# Patient Record
Sex: FEMALE | Race: WHITE | URBAN - METROPOLITAN AREA
[De-identification: names, ages, dates, MRNs, and addresses within clinical notes are randomized per-mention and may not be internally consistent; named-entity substitution may affect disease eponyms.]

---

## 2017-02-06 DIAGNOSIS — B18.2 CHRONIC HEPATITIS C WITHOUT HEPATIC COMA (H): Primary | ICD-10-CM

## 2017-03-13 ENCOUNTER — TELEPHONE (OUTPATIENT)
Dept: GASTROENTEROLOGY | Facility: CLINIC | Age: 57
End: 2017-03-13

## 2017-03-13 NOTE — TELEPHONE ENCOUNTER
Patient is coming up on being due for the 3 month post HCV Tx viral load as of 03/16/17. She completed HCV Tx with Harvoni x 8 weeks on 12/16/16, and had an undetected viral load. Reminder call to patient to complete lab. She will go to the [facility] this week, or early next week for viral load and liver labs. She will see Dr. Crawford on 03/28/17. The labs orders have been placed in Epic. Patient states understanding with no further questions at this time.     Jessy Serrano LPN  [facility] - Hepatitis C Program

## 2017-03-20 NOTE — TELEPHONE ENCOUNTER
Received a call from patient giancarlodeon she will be going to the [facility] for lab. Lab order faxed to [facility] at [phone]. I will f/u with patient once I receive results.     Jessy Serrano LPN  [facility] - Hepatitis C Program

## 2017-03-28 ENCOUNTER — OFFICE VISIT (OUTPATIENT)
Dept: GASTROENTEROLOGY | Facility: CLINIC | Age: 57
End: 2017-03-28
Payer: COMMERCIAL

## 2017-03-28 VITALS
HEART RATE: 104 BPM | HEIGHT: 62 IN | TEMPERATURE: 99.1 F | DIASTOLIC BLOOD PRESSURE: 75 MMHG | OXYGEN SATURATION: 98 % | WEIGHT: 113.2 LBS | BODY MASS INDEX: 20.83 KG/M2 | SYSTOLIC BLOOD PRESSURE: 122 MMHG

## 2017-03-28 DIAGNOSIS — B18.2 CHRONIC HEPATITIS C WITHOUT HEPATIC COMA (H): Primary | ICD-10-CM

## 2017-03-28 LAB — HCV RNA QUANT IU/ML: NOT DETECTED IU/ML

## 2017-03-28 PROCEDURE — 99212 OFFICE O/P EST SF 10 MIN: CPT | Mod: ZF

## 2017-03-28 ASSESSMENT — PAIN SCALES - GENERAL: PAINLEVEL: NO PAIN (0)

## 2017-03-28 NOTE — NURSING NOTE
Chief Complaint   Patient presents with     RECHECK     Hepatitis C   Pt roomed, vitals, meds, and allergies reviewed with pt. Pt ready for provider.  Dennis Alvarez, CMA

## 2017-03-28 NOTE — PROGRESS NOTES
I had the pleasure of seeing Delmy Gomez for followup in the Liver Clinic at the [facility] on 03/28/2017.  Ms. Gomez returns for followup now 3 months having completed treatment for her hepatitis C.  She received 8 weeks of Harvoni.      She is doing well at this visit.  She denies any abdominal pain, itching or skin rash or fatigue.  She feels as though she is starting to get an upper respiratory infection and has had just a minor fever as well as a sore throat.  She denies any cough or shortness of breath.  She denies any nausea or vomiting, diarrhea or constipation.  Her appetite has been good, and her weight has been stable.  There have been no other new events since she was last seen.       Current Outpatient Prescriptions   Medication     alendronate (FOSAMAX) 70 MG tablet     EPINEPHrine (EPIPEN) 0.3 MG/0.3ML injection     Cholecalciferol (VITAMIN D3 PO)     calcium carbonate (OS-LUIGI 500 MG [place]. [place]) 500 MG tablet     DiphenhydrAMINE HCl (BENADRYL PO)     ACYCLOVIR PO     No current facility-administered medications for this visit.      B/P: 122/75, T: 99.1, P: 104, R: Data Unavailable    HEENT exam shows no scleral icterus and no temporal muscle wasting.  Chest is clear.  Abdominal exam shows no increase in girth.  No masses or tenderness to palpation are present.  Liver is 10 cm in span without left lobe enlargement.  No spleen tip is palpable, and extremity exam shows no edema.  Skin exam shows no stigmata of chronic liver disease.  Neurologic exam shows no asterixis.       Her HCV RNA level is negative at 3 months after stopping treatment.      My impression is that Ms. Gomez is a sustained virologic responder to hepatitis C treatment, and she will not need to be followed at this point in time, as she has fairly minimal fibrosis as demonstrated on a fibrosis scan.  She obviously is very gratified to hear this, but she is welcome to come back if anything were to  change in the future.      Thank you very much for allowing me to participate in the care of this patient.  If you have any questions regarding my recommendations, please do not hesitate to contact me.       Martin Crawford MD      Professor of Medicine  [employer]      Executive Medical Director, Solid Organ Transplant Program  [facility]

## 2017-03-28 NOTE — TELEPHONE ENCOUNTER
Received HCV RNA quant result - not detected. Patient is cured of hepatitis C. She is seeing Dr. Crawford this morning, 03/28/17, where results/plan will be discussed. She will no longer require follow up in the liver clinic since she has stage 0-1 fibrosis (minimal to no liver scarring/damage). Results entered into Epic.     Jessy Serrano LPN  [facility] - Hepatitis C Program

## 2017-03-28 NOTE — LETTER
3/28/2017      RE: Delmy Valdivia  [address] [place] [address]       I had the pleasure of seeing Delmy Gomez for followup in the Liver Clinic at the [facility] on 03/28/2017.  Ms. Gomez returns for followup now 3 months having completed treatment for her hepatitis C.  She received 8 weeks of Harvoni.      She is doing well at this visit.  She denies any abdominal pain, itching or skin rash or fatigue.  She feels as though she is starting to get an upper respiratory infection and has had just a minor fever as well as a sore throat.  She denies any cough or shortness of breath.  She denies any nausea or vomiting, diarrhea or constipation.  Her appetite has been good, and her weight has been stable.  There have been no other new events since she was last seen.       Current Outpatient Prescriptions   Medication     alendronate (FOSAMAX) 70 MG tablet     EPINEPHrine (EPIPEN) 0.3 MG/0.3ML injection     Cholecalciferol (VITAMIN D3 PO)     calcium carbonate (OS-LUIGI 500 MG [place]. [place]) 500 MG tablet     DiphenhydrAMINE HCl (BENADRYL PO)     ACYCLOVIR PO     No current facility-administered medications for this visit.      B/P: 122/75, T: 99.1, P: 104, R: Data Unavailable    HEENT exam shows no scleral icterus and no temporal muscle wasting.  Chest is clear.  Abdominal exam shows no increase in girth.  No masses or tenderness to palpation are present.  Liver is 10 cm in span without left lobe enlargement.  No spleen tip is palpable, and extremity exam shows no edema.  Skin exam shows no stigmata of chronic liver disease.  Neurologic exam shows no asterixis.       Her HCV RNA level is negative at 3 months after stopping treatment.      My impression is that Ms. Gomez is a sustained virologic responder to hepatitis C treatment, and she will not need to be followed at this point in time, as she has fairly minimal fibrosis as demonstrated on a fibrosis scan.  She obviously  is very gratified to hear this, but she is welcome to come back if anything were to change in the future.      Thank you very much for allowing me to participate in the care of this patient.  If you have any questions regarding my recommendations, please do not hesitate to contact me.       Martin Crawford MD      Professor of Medicine  [employer]      Executive Medical Director, Solid Organ Transplant Program  [facility]

## 2017-03-28 NOTE — LETTER
3/28/2017       RE: Delmy Valdivia  [address] [place] [address]     Dear Colleague,    Thank you for referring your patient, Delmy Valdivia, to the [facility] HEPATOLOGY at [facility]. Please see a copy of my visit note below.    I had the pleasure of seeing Delmy Gomez for followup in the Liver Clinic at the [facility] on 03/28/2017.  Ms. Gomez returns for followup now 3 months having completed treatment for her hepatitis C.  She received 8 weeks of Harvoni.      She is doing well at this visit.  She denies any abdominal pain, itching or skin rash or fatigue.  She feels as though she is starting to get an upper respiratory infection and has had just a minor fever as well as a sore throat.  She denies any cough or shortness of breath.  She denies any nausea or vomiting, diarrhea or constipation.  Her appetite has been good, and her weight has been stable.  There have been no other new events since she was last seen.       Current Outpatient Prescriptions   Medication     alendronate (FOSAMAX) 70 MG tablet     EPINEPHrine (EPIPEN) 0.3 MG/0.3ML injection     Cholecalciferol (VITAMIN D3 PO)     calcium carbonate (OS-LUIGI 500 MG [place]. [place]) 500 MG tablet     DiphenhydrAMINE HCl (BENADRYL PO)     ACYCLOVIR PO     No current facility-administered medications for this visit.      B/P: 122/75, T: 99.1, P: 104, R: Data Unavailable    HEENT exam shows no scleral icterus and no temporal muscle wasting.  Chest is clear.  Abdominal exam shows no increase in girth.  No masses or tenderness to palpation are present.  Liver is 10 cm in span without left lobe enlargement.  No spleen tip is palpable, and extremity exam shows no edema.  Skin exam shows no stigmata of chronic liver disease.  Neurologic exam shows no asterixis.       Her HCV RNA level is negative at 3 months after stopping treatment.      My impression is that Ms. Gomez is a  sustained virologic responder to hepatitis C treatment, and she will not need to be followed at this point in time, as she has fairly minimal fibrosis as demonstrated on a fibrosis scan.  She obviously is very gratified to hear this, but she is welcome to come back if anything were to change in the future.      Thank you very much for allowing me to participate in the care of this patient.  If you have any questions regarding my recommendations, please do not hesitate to contact me.       Martin Crawford MD      Professor of Medicine  [employer]      Executive Medical Director, Solid Organ Transplant Program

## 2017-03-28 NOTE — MR AVS SNAPSHOT
"              After Visit Summary   3/28/2017    Delmy Valdivia    MRN: [id number]           Patient Information     Date Of Birth          1960        Visit Information        Provider Department      3/28/2017 11:00 AM Martin Crawford MD [facility] Hepatology        Today's Diagnoses     Chronic hepatitis C without hepatic coma (H)    -  1       Follow-ups after your visit        Follow-up notes from your care team     Return if symptoms worsen or fail to improve.      Who to contact     If you have questions or need follow up information about today's clinic visit or your schedule please contact [facility] HEPATOLOGY directly at [phone].  Normal or non-critical lab and imaging results will be communicated to you by [employer], letter or phone within 4 business days after the clinic has received the results. If you do not hear from us within 7 days, please contact the clinic through [employer] or phone. If you have a critical or abnormal lab result, we will notify you by phone as soon as possible.  Submit refill requests through [employer] or call your pharmacy and they will forward the refill request to us. Please allow 3 business days for your refill to be completed.          Additional Information About Your Visit        MyChart Information     [employer] lets you send messages to your doctor, view your test results, renew your prescriptions, schedule appointments and more. To sign up, go to [email or web address] . Click on \"Log in\" on the left side of the screen, which will take you to the Welcome page. Then click on \"Sign up Now\" on the right side of the page.     You will be asked to enter the access code listed below, as well as some personal information. Please follow the directions to create your username and password.     Your access code is: [email or web address]  Expires: 2017  6:30 AM     Your access code will  in 90 days. If you need help or a new code, please call your [facility] or [phone].   " "     Care EveryWhere ID     This is your Care EveryWhere ID. This could be used by other organizations to access your [place] medical records  [id number]        Your Vitals Were     Pulse Temperature Height Pulse Oximetry BMI (Body Mass Index)       104 99.1  F (37.3  C) (Oral) 1.575 m (5' 2\") 98% 20.7 kg/m2        Blood Pressure from Last 3 Encounters:   03/28/17 122/75   11/22/16 117/80   07/18/16 146/79    Weight from Last 3 Encounters:   03/28/17 51.3 kg (113 lb 3.2 oz)   11/22/16 51.2 kg (112 lb 12.8 oz)   07/18/16 51.3 kg (113 lb 3.2 oz)              Today, you had the following     No orders found for display       Primary Care Provider Office Phone # Fax #    Sachin Nixon [phone]       [address] [address]        Thank you!     Thank you for choosing [facility] HEPATOLOGY  for your care. Our goal is always to provide you with excellent care. Hearing back from our patients is one way we can continue to improve our services. Please take a few minutes to complete the written survey that you may receive in the mail after your visit with us. Thank you!             Your Updated Medication List - Protect others around you: Learn how to safely use, store and throw away your medicines at www.disposemymeds.org.          This list is accurate as of: 3/28/17 11:59 PM.  Always use your most recent med list.                   Brand Name Dispense Instructions for use    ACYCLOVIR PO      Take by mouth as needed Reported on 3/28/2017       alendronate 70 MG tablet    FOSAMAX     Take 70 mg by mouth every 7 days       BENADRYL PO      Take by mouth as needed       calcium carbonate 500 MG tablet    OS-LUIGI 500 mg [place]. [place]     Take 500 mg by mouth daily       EPINEPHrine 0.3 MG/0.3ML injection      Inject 0.3 mg into the muscle once as needed for anaphylaxis       VITAMIN D3 PO      Take 1,000 Units by mouth daily         "

## 2022-11-28 ENCOUNTER — APPOINTMENT (OUTPATIENT)
Dept: URBAN - METROPOLITAN AREA CLINIC 256 | Age: 62
Setting detail: DERMATOLOGY
End: 2022-11-29

## 2022-11-28 DIAGNOSIS — Z71.89 OTHER SPECIFIED COUNSELING: ICD-10-CM

## 2022-11-28 DIAGNOSIS — L82.0 INFLAMED SEBORRHEIC KERATOSIS: ICD-10-CM

## 2022-11-28 DIAGNOSIS — D22 MELANOCYTIC NEVI: ICD-10-CM

## 2022-11-28 DIAGNOSIS — L57.8 OTHER SKIN CHANGES DUE TO CHRONIC EXPOSURE TO NONIONIZING RADIATION: ICD-10-CM

## 2022-11-28 DIAGNOSIS — L82.1 OTHER SEBORRHEIC KERATOSIS: ICD-10-CM

## 2022-11-28 DIAGNOSIS — D18.0 HEMANGIOMA: ICD-10-CM

## 2022-11-28 PROBLEM — D22.5 MELANOCYTIC NEVI OF TRUNK: Status: ACTIVE | Noted: 2022-11-28

## 2022-11-28 PROBLEM — D18.01 HEMANGIOMA OF SKIN AND SUBCUTANEOUS TISSUE: Status: ACTIVE | Noted: 2022-11-28

## 2022-11-28 PROCEDURE — OTHER LIQUID NITROGEN: OTHER

## 2022-11-28 PROCEDURE — OTHER MIPS QUALITY: OTHER

## 2022-11-28 PROCEDURE — 17110 DESTRUCT B9 LESION 1-14: CPT

## 2022-11-28 PROCEDURE — 99213 OFFICE O/P EST LOW 20 MIN: CPT | Mod: 25

## 2022-11-28 PROCEDURE — OTHER COUNSELING: OTHER

## 2022-11-28 ASSESSMENT — LOCATION ZONE DERM
LOCATION ZONE: FACE
LOCATION ZONE: LEG
LOCATION ZONE: TRUNK

## 2022-11-28 ASSESSMENT — LOCATION SIMPLE DESCRIPTION DERM
LOCATION SIMPLE: LEFT THIGH
LOCATION SIMPLE: LEFT LOWER BACK
LOCATION SIMPLE: LEFT UPPER BACK
LOCATION SIMPLE: LEFT EYEBROW
LOCATION SIMPLE: RIGHT THIGH

## 2022-11-28 ASSESSMENT — LOCATION DETAILED DESCRIPTION DERM
LOCATION DETAILED: LEFT SUPERIOR LATERAL LOWER BACK
LOCATION DETAILED: LEFT MEDIAL UPPER BACK
LOCATION DETAILED: LEFT CENTRAL EYEBROW
LOCATION DETAILED: LEFT SUPERIOR UPPER BACK
LOCATION DETAILED: LEFT ANTERIOR PROXIMAL THIGH
LOCATION DETAILED: LEFT SUPERIOR MEDIAL MIDBACK
LOCATION DETAILED: RIGHT ANTERIOR PROXIMAL THIGH
LOCATION DETAILED: LEFT INFERIOR UPPER BACK

## 2022-11-28 NOTE — PROCEDURE: COUNSELING
Detail Level: Generalized
Patient Specific Counseling (Will Not Stick From Patient To Patient): -Recommended the patient to wear gloves that cover her hands entirely while riding her bike outside.
Detail Level: Zone
Detail Level: Detailed

## 2022-11-28 NOTE — PROCEDURE: LIQUID NITROGEN
Show Spray Paint Technique Variable?: Yes
Add 52 Modifier (Optional): no
Consent: The patient's consent was obtained including but not limited to risks of crusting, scabbing, blistering, scarring, darker or lighter pigmentary change, recurrence, incomplete removal and infection.
Spray Paint Text: The liquid nitrogen was applied to the skin utilizing a spray paint frosting technique.
Medical Necessity Information: It is in your best interest to select a reason for this procedure from the list below. All of these items fulfill various CMS LCD requirements except the new and changing color options.
Detail Level: Zone
Duration Of Freeze Thaw-Cycle (Seconds): 5
Number Of Freeze-Thaw Cycles: 1 freeze-thaw cycle
Post-Care Instructions: I reviewed with the patient in detail post-care instructions. Patient is to wear sunprotection, and avoid picking at any of the treated lesions. Pt may apply Vaseline to crusted or scabbing areas.
Medical Necessity Clause: This procedure was medically necessary because the lesions that were treated were:
Total Number Of Lesions Treated: 2

## 2024-11-18 ENCOUNTER — APPOINTMENT (OUTPATIENT)
Dept: URBAN - METROPOLITAN AREA CLINIC 256 | Age: 64
Setting detail: DERMATOLOGY
End: 2024-11-18

## 2024-11-18 VITALS — HEIGHT: 62 IN | WEIGHT: 116 LBS

## 2024-11-18 DIAGNOSIS — D18.0 HEMANGIOMA: ICD-10-CM

## 2024-11-18 DIAGNOSIS — D22 MELANOCYTIC NEVI: ICD-10-CM

## 2024-11-18 DIAGNOSIS — L57.8 OTHER SKIN CHANGES DUE TO CHRONIC EXPOSURE TO NONIONIZING RADIATION: ICD-10-CM

## 2024-11-18 DIAGNOSIS — Z71.89 OTHER SPECIFIED COUNSELING: ICD-10-CM

## 2024-11-18 DIAGNOSIS — L82.0 INFLAMED SEBORRHEIC KERATOSIS: ICD-10-CM

## 2024-11-18 DIAGNOSIS — L57.0 ACTINIC KERATOSIS: ICD-10-CM

## 2024-11-18 DIAGNOSIS — L82.1 OTHER SEBORRHEIC KERATOSIS: ICD-10-CM

## 2024-11-18 PROBLEM — D22.39 MELANOCYTIC NEVI OF OTHER PARTS OF FACE: Status: ACTIVE | Noted: 2024-11-18

## 2024-11-18 PROBLEM — D22.5 MELANOCYTIC NEVI OF TRUNK: Status: ACTIVE | Noted: 2024-11-18

## 2024-11-18 PROBLEM — D22.62 MELANOCYTIC NEVI OF LEFT UPPER LIMB, INCLUDING SHOULDER: Status: ACTIVE | Noted: 2024-11-18

## 2024-11-18 PROBLEM — D22.72 MELANOCYTIC NEVI OF LEFT LOWER LIMB, INCLUDING HIP: Status: ACTIVE | Noted: 2024-11-18

## 2024-11-18 PROBLEM — D22.71 MELANOCYTIC NEVI OF RIGHT LOWER LIMB, INCLUDING HIP: Status: ACTIVE | Noted: 2024-11-18

## 2024-11-18 PROBLEM — D22.61 MELANOCYTIC NEVI OF RIGHT UPPER LIMB, INCLUDING SHOULDER: Status: ACTIVE | Noted: 2024-11-18

## 2024-11-18 PROBLEM — D18.01 HEMANGIOMA OF SKIN AND SUBCUTANEOUS TISSUE: Status: ACTIVE | Noted: 2024-11-18

## 2024-11-18 PROCEDURE — OTHER COUNSELING: OTHER

## 2024-11-18 PROCEDURE — 17000 DESTRUCT PREMALG LESION: CPT | Mod: 59

## 2024-11-18 PROCEDURE — OTHER MIPS QUALITY: OTHER

## 2024-11-18 PROCEDURE — 99213 OFFICE O/P EST LOW 20 MIN: CPT | Mod: 25

## 2024-11-18 PROCEDURE — 17110 DESTRUCT B9 LESION 1-14: CPT

## 2024-11-18 PROCEDURE — OTHER LIQUID NITROGEN: OTHER

## 2024-11-18 ASSESSMENT — LOCATION DETAILED DESCRIPTION DERM
LOCATION DETAILED: LEFT VENTRAL PROXIMAL FOREARM
LOCATION DETAILED: LEFT LATERAL ABDOMEN
LOCATION DETAILED: RIGHT MEDIAL FOREHEAD
LOCATION DETAILED: LEFT ANTERIOR PROXIMAL THIGH
LOCATION DETAILED: LEFT INFERIOR CENTRAL MALAR CHEEK
LOCATION DETAILED: RIGHT DISTAL POSTERIOR THIGH
LOCATION DETAILED: RIGHT DISTAL POSTERIOR UPPER ARM
LOCATION DETAILED: RIGHT VENTRAL DISTAL FOREARM
LOCATION DETAILED: LEFT SUPERIOR MEDIAL MIDBACK
LOCATION DETAILED: LEFT FOREHEAD
LOCATION DETAILED: LEFT DISTAL POSTERIOR THIGH
LOCATION DETAILED: RIGHT ANTERIOR DISTAL THIGH
LOCATION DETAILED: LEFT DISTAL POSTERIOR UPPER ARM
LOCATION DETAILED: LEFT INFERIOR MEDIAL MIDBACK

## 2024-11-18 ASSESSMENT — LOCATION SIMPLE DESCRIPTION DERM
LOCATION SIMPLE: LEFT THIGH
LOCATION SIMPLE: LEFT LOWER BACK
LOCATION SIMPLE: LEFT FOREHEAD
LOCATION SIMPLE: RIGHT THIGH
LOCATION SIMPLE: RIGHT FOREARM
LOCATION SIMPLE: RIGHT POSTERIOR THIGH
LOCATION SIMPLE: LEFT CHEEK
LOCATION SIMPLE: LEFT POSTERIOR THIGH
LOCATION SIMPLE: RIGHT FOREHEAD
LOCATION SIMPLE: RIGHT UPPER ARM
LOCATION SIMPLE: ABDOMEN
LOCATION SIMPLE: LEFT UPPER ARM
LOCATION SIMPLE: LEFT FOREARM

## 2024-11-18 ASSESSMENT — LOCATION ZONE DERM
LOCATION ZONE: LEG
LOCATION ZONE: ARM
LOCATION ZONE: FACE
LOCATION ZONE: TRUNK

## 2024-11-18 NOTE — PROCEDURE: LIQUID NITROGEN
Spray Paint Text: The liquid nitrogen was applied to the skin utilizing a spray paint frosting technique.
Show Topical Anesthesia Variable?: Yes
Medical Necessity Information: It is in your best interest to select a reason for this procedure from the list below. All of these items fulfill various CMS LCD requirements except the new and changing color options.
Spray Paint Technique: No
Post-Care Instructions: I reviewed with the patient in detail post-care instructions. Patient is to wear sunprotection, and avoid picking at any of the treated lesions. Pt may apply Vaseline to crusted or scabbing areas.
Detail Level: Detailed
Consent: The patient's consent was obtained including but not limited to risks of crusting, scabbing, blistering, scarring, darker or lighter pigmentary change, recurrence, incomplete removal and infection.
Medical Necessity Clause: This procedure was medically necessary because the lesions that were treated were:
Duration Of Freeze Thaw-Cycle (Seconds): 0

## 2025-03-19 ENCOUNTER — APPOINTMENT (OUTPATIENT)
Dept: URBAN - METROPOLITAN AREA CLINIC 256 | Age: 65
Setting detail: DERMATOLOGY
End: 2025-03-19

## 2025-03-19 DIAGNOSIS — Z41.9 ENCOUNTER FOR PROCEDURE FOR PURPOSES OTHER THAN REMEDYING HEALTH STATE, UNSPECIFIED: ICD-10-CM

## 2025-03-19 PROCEDURE — OTHER FRAXEL: OTHER

## 2025-03-19 PROCEDURE — OTHER COSMETIC CONSULTATION: LASER RESURFACING: OTHER

## 2025-03-19 NOTE — PROCEDURE: FRAXEL
Add Post-Care Below To The Note: No
Energy(Mj/Cm2): 15
Energy(Mj/Cm2): 1
Tip: 15mm
Depth In Microns (Use Numbers Only, No Special Characters Or $): 3844
Small Plastic Eye Shield Text: The ocular mucosa was anesthetized with tetracaine. Once adequate anesthesia was optained, small plastic eye shields were inserted and remained in place until the procedure was completed.
Treatment Level: 5
Wavelength: 1550nm
Depth In Microns (Use Numbers Only, No Special Characters Or $): 6274
Number Of Passes: 8
Total Coverage: 26%
Medium Plastic Eye Shield Text: The ocular mucosa was anesthetized with tetracaine. Once adequate anesthesia was optained, medium plastic eye shields were inserted and remained in place until the procedure was completed.
Post-Care Instructions: I reviewed with the patient in detail post-care instructions. Patient should avoid sun until area fully healed.
Topical Anesthesia Type: 23% lidocaine, 7% tetracaine, 2% phenylephrine
Small Metal Eye Shield Text: The ocular mucosa was anesthetized with tetracaine. Once adequate anesthesia was optained, small metal eye shields were inserted and remained in place until the procedure was completed.
Energy(Mj/Cm2): 50
Location: Use Location Override
Treatment Level: 10
Total Coverage: 32%
Large Plastic Eye Shield Text: The ocular mucosa was anesthetized with tetracaine. Once adequate anesthesia was optained, large plastic eye shields were inserted and remained in place until the procedure was completed.
External Cooling: Gianna Cryo 5
Detail Level: Zone
Medium Metal Eye Shield Text: The ocular mucosa was anesthetized with tetracaine. Once adequate anesthesia was optained, medium metal eye shields were inserted and remained in place until the procedure was completed.
Number Of Passes: 3
Number Of Passes: 6
Energy(Mj/Cm2): 55
Length Of Topical Anesthesia Application (Optional): 60 minutes
Total Energy In Kj (Optional- Don't Include Units): 3.78
Location: neck
Energy(Mj/Cm2): 60
Large Metal Eye Shield Text: The ocular mucosa was anesthetized with tetracaine. Once adequate anesthesia was optained, large metal eye shields were inserted and remained in place until the procedure was completed.
Number Of Passes: 2
Depth In Microns (Use Numbers Only, No Special Characters Or $): 199
Treatment Level: 9
Depth In Microns (Use Numbers Only, No Special Characters Or $): 7755
Location: full face
Indication: moderate to severe perioral rhytides
Treatment Level: 11
Location: upper cutaneous lip
Consent: Written consent obtained, risks reviewed including but not limited to pain and incomplete improvement.